# Patient Record
Sex: FEMALE | Race: WHITE | Employment: OTHER | URBAN - METROPOLITAN AREA
[De-identification: names, ages, dates, MRNs, and addresses within clinical notes are randomized per-mention and may not be internally consistent; named-entity substitution may affect disease eponyms.]

---

## 2022-03-11 ENCOUNTER — APPOINTMENT (OUTPATIENT)
Dept: CT IMAGING | Age: 72
End: 2022-03-11
Payer: MEDICARE

## 2022-03-11 ENCOUNTER — HOSPITAL ENCOUNTER (EMERGENCY)
Age: 72
Discharge: HOME | End: 2022-03-11
Attending: STUDENT IN AN ORGANIZED HEALTH CARE EDUCATION/TRAINING PROGRAM
Payer: MEDICARE

## 2022-03-11 ENCOUNTER — APPOINTMENT (OUTPATIENT)
Dept: GENERAL RADIOLOGY | Age: 72
End: 2022-03-11
Payer: MEDICARE

## 2022-03-11 VITALS
HEIGHT: 70 IN | RESPIRATION RATE: 14 BRPM | TEMPERATURE: 97.9 F | OXYGEN SATURATION: 99 % | BODY MASS INDEX: 23.62 KG/M2 | DIASTOLIC BLOOD PRESSURE: 73 MMHG | HEART RATE: 100 BPM | SYSTOLIC BLOOD PRESSURE: 162 MMHG | WEIGHT: 165 LBS

## 2022-03-11 DIAGNOSIS — R07.9 CHEST PAIN, UNSPECIFIED TYPE: Primary | ICD-10-CM

## 2022-03-11 LAB
A/G RATIO: 1.7 (ref 1.1–2.2)
ALBUMIN SERPL-MCNC: 4.8 G/DL (ref 3.4–5)
ALP BLD-CCNC: 71 U/L (ref 40–129)
ALT SERPL-CCNC: 15 U/L (ref 10–40)
ANION GAP SERPL CALCULATED.3IONS-SCNC: 14 MMOL/L (ref 3–16)
AST SERPL-CCNC: 15 U/L (ref 15–37)
BASOPHILS ABSOLUTE: 0 K/UL (ref 0–0.2)
BASOPHILS RELATIVE PERCENT: 0.5 %
BILIRUB SERPL-MCNC: 0.6 MG/DL (ref 0–1)
BUN BLDV-MCNC: 20 MG/DL (ref 7–20)
CALCIUM SERPL-MCNC: 10.3 MG/DL (ref 8.3–10.6)
CHLORIDE BLD-SCNC: 98 MMOL/L (ref 99–110)
CO2: 24 MMOL/L (ref 21–32)
CREAT SERPL-MCNC: 1 MG/DL (ref 0.6–1.2)
EOSINOPHILS ABSOLUTE: 0 K/UL (ref 0–0.6)
EOSINOPHILS RELATIVE PERCENT: 0.2 %
GFR AFRICAN AMERICAN: >60
GFR NON-AFRICAN AMERICAN: 55
GLUCOSE BLD-MCNC: 121 MG/DL (ref 70–99)
HCT VFR BLD CALC: 37.3 % (ref 36–48)
HEMOGLOBIN: 12.6 G/DL (ref 12–16)
LYMPHOCYTES ABSOLUTE: 1.8 K/UL (ref 1–5.1)
LYMPHOCYTES RELATIVE PERCENT: 27.5 %
MCH RBC QN AUTO: 30.7 PG (ref 26–34)
MCHC RBC AUTO-ENTMCNC: 33.7 G/DL (ref 31–36)
MCV RBC AUTO: 91.2 FL (ref 80–100)
MONOCYTES ABSOLUTE: 0.4 K/UL (ref 0–1.3)
MONOCYTES RELATIVE PERCENT: 5.7 %
NEUTROPHILS ABSOLUTE: 4.3 K/UL (ref 1.7–7.7)
NEUTROPHILS RELATIVE PERCENT: 66.1 %
PDW BLD-RTO: 13.7 % (ref 12.4–15.4)
PLATELET # BLD: 316 K/UL (ref 135–450)
PMV BLD AUTO: 7.6 FL (ref 5–10.5)
POTASSIUM SERPL-SCNC: 4.1 MMOL/L (ref 3.5–5.1)
RBC # BLD: 4.09 M/UL (ref 4–5.2)
SODIUM BLD-SCNC: 136 MMOL/L (ref 136–145)
TOTAL PROTEIN: 7.6 G/DL (ref 6.4–8.2)
TROPONIN: <0.01 NG/ML
TROPONIN: <0.01 NG/ML
WBC # BLD: 6.4 K/UL (ref 4–11)

## 2022-03-11 PROCEDURE — 99284 EMERGENCY DEPT VISIT MOD MDM: CPT

## 2022-03-11 PROCEDURE — 99285 EMERGENCY DEPT VISIT HI MDM: CPT

## 2022-03-11 PROCEDURE — 71046 X-RAY EXAM CHEST 2 VIEWS: CPT

## 2022-03-11 PROCEDURE — 71260 CT THORAX DX C+: CPT

## 2022-03-11 PROCEDURE — 80053 COMPREHEN METABOLIC PANEL: CPT

## 2022-03-11 PROCEDURE — 36415 COLL VENOUS BLD VENIPUNCTURE: CPT

## 2022-03-11 PROCEDURE — 84484 ASSAY OF TROPONIN QUANT: CPT

## 2022-03-11 PROCEDURE — 93005 ELECTROCARDIOGRAM TRACING: CPT | Performed by: EMERGENCY MEDICINE

## 2022-03-11 PROCEDURE — 6370000000 HC RX 637 (ALT 250 FOR IP): Performed by: NURSE PRACTITIONER

## 2022-03-11 PROCEDURE — 85025 COMPLETE CBC W/AUTO DIFF WBC: CPT

## 2022-03-11 PROCEDURE — 2500000003 HC RX 250 WO HCPCS: Performed by: NURSE PRACTITIONER

## 2022-03-11 PROCEDURE — 6360000004 HC RX CONTRAST MEDICATION: Performed by: NURSE PRACTITIONER

## 2022-03-11 PROCEDURE — 96374 THER/PROPH/DIAG INJ IV PUSH: CPT

## 2022-03-11 RX ORDER — NITROGLYCERIN 0.4 MG/1
0.4 TABLET SUBLINGUAL EVERY 5 MIN PRN
Status: DISCONTINUED | OUTPATIENT
Start: 2022-03-11 | End: 2022-03-11 | Stop reason: HOSPADM

## 2022-03-11 RX ADMIN — FAMOTIDINE 20 MG: 10 INJECTION, SOLUTION INTRAVENOUS at 16:33

## 2022-03-11 RX ADMIN — IOPAMIDOL 85 ML: 755 INJECTION, SOLUTION INTRAVENOUS at 18:14

## 2022-03-11 RX ADMIN — LIDOCAINE HYDROCHLORIDE: 20 SOLUTION ORAL; TOPICAL at 16:34

## 2022-03-11 RX ADMIN — NITROGLYCERIN 0.4 MG: 0.4 TABLET SUBLINGUAL at 17:07

## 2022-03-11 RX ADMIN — NITROGLYCERIN 0.4 MG: 0.4 TABLET SUBLINGUAL at 16:34

## 2022-03-11 ASSESSMENT — PAIN DESCRIPTION - LOCATION
LOCATION: CHEST
LOCATION: CHEST

## 2022-03-11 ASSESSMENT — PAIN DESCRIPTION - ORIENTATION: ORIENTATION: MID

## 2022-03-11 ASSESSMENT — PAIN DESCRIPTION - PAIN TYPE
TYPE: ACUTE PAIN
TYPE: ACUTE PAIN

## 2022-03-11 ASSESSMENT — PAIN SCALES - GENERAL
PAINLEVEL_OUTOF10: 2
PAINLEVEL_OUTOF10: 5

## 2022-03-11 ASSESSMENT — PAIN DESCRIPTION - FREQUENCY: FREQUENCY: INTERMITTENT

## 2022-03-11 ASSESSMENT — ENCOUNTER SYMPTOMS
SHORTNESS OF BREATH: 0
COLOR CHANGE: 0
FACIAL SWELLING: 0
CHEST TIGHTNESS: 1
RHINORRHEA: 0
SORE THROAT: 0
ABDOMINAL PAIN: 0

## 2022-03-11 ASSESSMENT — PAIN - FUNCTIONAL ASSESSMENT: PAIN_FUNCTIONAL_ASSESSMENT: 0-10

## 2022-03-11 ASSESSMENT — PAIN DESCRIPTION - DESCRIPTORS: DESCRIPTORS: PRESSURE

## 2022-03-11 ASSESSMENT — HEART SCORE: ECG: 0

## 2022-03-11 NOTE — ED NOTES
[id number] - [facility]      [facility]  03/11/22 1814    Dr. Crescencio Rojas is on call for group     [facility]  03/11/22 1819    Heather Tineo - Dr. Lynne Vega called back transferred to KISHAN Rust [facility]  03/11/22 1842

## 2022-03-11 NOTE — ED PROVIDER NOTES
[address] ED  EMERGENCY DEPARTMENT ENCOUNTER        Pt Name: Jamel Green  MRN: [id number]  [place] 1950  Dateof evaluation: 3/11/2022  Provider: PONCE Zamora - NARENDRA  PCP: Dheeraj Fierro MD  ED Attending: No att. providers found    [address] [facility]       Chief Complaint   Patient presents with    Chest Pain     COVID in Jan. c/o mid Sternal CP x1 week, HX: A. Fib        HISTORY OF PRESENTILLNESS   (Location/Symptom, Timing/Onset, Context/Setting, Quality, Duration, Modifying Factors, Severity)  Note limiting factors. Jamel Green is a 73 y.o. female for chest tightness. Onset was today. Context includes patient reports that she has been followed by cardiology in the past and had a negative stress test and December 2021. Patient states that she also had Covid in 2022 and ever since then has had intermittent chest tightness. Patient reports that she has a burning and pressure sensation to the left side of her chest since yesterday. She denies that it is pleuritic or worse with exertion. Alleviating factors include nothing. Aggravating factors include nothing. Pain is 4/10. Antacids has been used for pain today. Nursing Notes were all reviewed and agreed with or any disagreements were addressed  in the HPI. REVIEW OF SYSTEMS    (2-9 systems for level 4, 10 or more for level 5)     Review of Systems   Constitutional: Negative for activity change, appetite change and fever. HENT: Negative for congestion, facial swelling, rhinorrhea and sore throat. Eyes: Negative for visual disturbance. Respiratory: Positive for chest tightness. Negative for shortness of breath. Cardiovascular: Negative for chest pain. Gastrointestinal: Negative for abdominal pain. Genitourinary: Negative for difficulty urinating. Musculoskeletal: Negative for arthralgias and myalgias. Skin: Negative for color change and rash.    Neurological: Negative for dizziness and light-headedness. Psychiatric/Behavioral: Negative for agitation. All other systems reviewed and are negative. Positives and Pertinent negatives as per HPI. Except as noted above in the ROS, all other systems were reviewed and negative.        PAST MEDICAL HISTORY     Past Medical History:   Diagnosis Date    A-fib ([facility] [address]     Hyperlipidemia     Hypertension     Pneumothorax, spontaneous, tension     1977         SURGICAL HISTORY       Past Surgical History:   Procedure Laterality Date    CAPSULOTOMY Right 2/10/16    CATARACT REMOVAL WITH IMPLANT Left 6/3/15    CATARACT REMOVAL WITH IMPLANT Right 6/17/15    CHEST TUBE INSERTION      1977    CHOLECYSTECTOMY      COLONOSCOPY  07/19/2016    diverticulosis         CURRENT MEDICATIONS       Previous Medications    ASPIRIN EC 81 MG EC TABLET    Take 1 tablet by mouth daily    ATORVASTATIN (LIPITOR) 10 MG TABLET    Take 10 mg by mouth daily    DILTIAZEM (CARDIZEM CD) 120 MG ER CAPSULE    Take 1 capsule by mouth daily    FLECAINIDE (TAMBOCOR) 50 MG TABLET    Take 150 mg by mouth 2 times daily     OMEGA-3 FATTY ACIDS (FISH OIL PO)    Take 1,200 mg by mouth daily          ALLERGIES     Calcium channel blockers    FAMILY HISTORY       Family History   Problem Relation Age of Onset    Diabetes Mother     Cancer Mother         breast    Heart Disease Father     High Blood Pressure Father     Cancer Sister         breast    Cancer Brother         prostate    Diabetes Brother     High Blood Pressure Brother           SOCIAL HISTORY       Social History     Socioeconomic History    Marital status:      Spouse name: None    Number of children: None    Years of education: None    Highest education level: None   Occupational History    None   Tobacco Use    Smoking status: Never Smoker    Smokeless tobacco: None   Substance and Sexual Activity    Alcohol use: Yes     Comment: 3/mth    Drug use: None    Sexual activity: None   Other Topics Concern    None   Social History Narrative    None     Social Determinants of Health     Financial Resource Strain:     Difficulty of Paying Living Expenses: Not on file   Food Insecurity:     Worried About Running Out of Food in the Last Year: Not on file    Elvia of Food in the Last Year: Not on file   Transportation Needs:     Lack of Transportation (Medical): Not on file    Lack of Transportation (Non-Medical):  Not on file   Physical Activity:     Days of Exercise per Week: Not on file    Minutes of Exercise per Session: Not on file   Stress:     Feeling of Stress : Not on file   Social Connections:     Frequency of Communication with Friends and Family: Not on file    Frequency of Social Gatherings with Friends and Family: Not on file    Attends Restorationist Services: Not on file    Active Member of [address] [place] [address] [employer] or Organizations: Not on file    Attends Club or Organization Meetings: Not on file    Marital Status: Not on file   Intimate Partner Violence:     Fear of Current or Ex-Partner: Not on file    Emotionally Abused: Not on file    Physically Abused: Not on file    Sexually Abused: Not on file   Housing Stability:     Unable to Pay for Housing in the Last Year: Not on file    Number of [place] in the Last Year: Not on file    Unstable Housing in the Last Year: Not on file       SCREENINGS    Chestnut Coma Scale  Eye Opening: Spontaneous  Best Verbal Response: Oriented  Best Motor Response: Obeys commands  Chestnut Coma Scale Score: 15 Heart Score for chest pain patients  History: Slightly Suspicious  ECG: Normal  Patient Age: > 65 years  *Risk factors for Atherosclerotic disease: Hypertension,Hypercholesterolemia  Risk Factors: 1 or 2 risk factors  Troponin: < 1X normal limit  Heart Score Total: 3      PHYSICAL EXAM  (up to 7 for level 4, 8 or more for level 5)     ED Triage Vitals [03/11/22 1531]   BP Temp Temp Source Pulse Resp SpO2 Height Weight   (!) 167/79 97.9 °F (36.6 °C) Oral 97 18 100 % 5' 10\" (1.778 m) 165 lb (74.8 kg)       Physical Exam  Constitutional:       Appearance: She is well-developed. HENT:      Head: Normocephalic and atraumatic. Cardiovascular:      Rate and Rhythm: Normal rate. Pulmonary:      Effort: Pulmonary effort is normal. No respiratory distress. Abdominal:      General: There is no distension. Palpations: Abdomen is soft. Tenderness: There is no abdominal tenderness. Musculoskeletal:         General: Normal range of motion. Cervical back: Normal range of motion. Skin:     General: Skin is warm and dry. Neurological:      Mental Status: She is alert and oriented to person, place, and time. DIAGNOSTIC RESULTS   LABS:    Labs Reviewed   COMPREHENSIVE METABOLIC PANEL - Abnormal; Notable for the following components:       Result Value    Chloride 98 (*)     Glucose 121 (*)     GFR Non- 55 (*)     All other components within normal limits   CBC WITH AUTO DIFFERENTIAL   TROPONIN   TROPONIN       All other labs werewithin normal range or not returned as of this dictation. EKG: All EKG's are interpreted by the Emergency Department Physician who either signs or Co-signs this chart in the absence of a cardiologist.  Please see their note for interpretation of EKG. RADIOLOGY:     Chest x-ray interpreted by radiologist for  Impression:    No acute process. CT chest pulmonary embolism with contrast interpreted by radiologist for  Impression:    No evidence of pulmonary embolism or acute thoracic aortic abnormality. Minimal biapical scarring.  Otherwise, clear lungs. Interpretation per the Radiologist below, if available at the time of this note:    CT CHEST PULMONARY EMBOLISM W CONTRAST   Final Result   No evidence of pulmonary embolism or acute thoracic aortic abnormality. Minimal biapical scarring. Otherwise, clear lungs. XR CHEST (2 VW)   Final Result   No acute process.            XR CHEST (2 VW)    Result Date: 3/11/2022  EXAMINATION: TWO XRAY VIEWS OF THE CHEST 3/11/2022 3:42 pm COMPARISON: 10/03/2015 HISTORY: ORDERING SYSTEM PROVIDED HISTORY: chest pain TECHNOLOGIST PROVIDED HISTORY: Reason for exam:->chest pain Reason for Exam: chest pain FINDINGS: The lungs are without acute focal process. There is no effusion or pneumothorax. The cardiomediastinal silhouette is stable. The osseous structures are stable. No acute process. PROCEDURES   Unless otherwise noted below, none     Procedures     CRITICAL CARE TIME   N/A    CONSULTS:  IP CONSULT TO CARDIOLOGY      EMERGENCYDEPARTMENT COURSE and DIFFERENTIAL DIAGNOSIS/MDM:   Vitals:    Vitals:    03/11/22 1602 03/11/22 1706 03/11/22 1740 03/11/22 1800   BP: (!) 165/70 (!) 161/67 (!) 155/73 (!) 162/73   Pulse: 92 87 97 100   Resp: 12 18 14 14   Temp:       TempSrc:       SpO2: 98% 99% 99% 99%   Weight:       Height:           Patient was given the following medications:  Medications   nitroGLYCERIN (NITROSTAT) SL tablet 0.4 mg (0.4 mg SubLINGual Given 3/11/22 1707)   aluminum & magnesium hydroxide-simethicone (MAALOX) 30 mL, lidocaine viscous hcl (XYLOCAINE) 5 mL (GI COCKTAIL) ( Oral Given 3/11/22 1634)   famotidine (PEPCID) injection 20 mg (20 mg IntraVENous Given 3/11/22 1633)   iopamidol (ISOVUE-370) 76 % injection 85 mL (85 mLs IntraVENous Given 3/11/22 1814)     Patient was seen and evaluated by myself and Dr. Cleve Burch. Patient here for concerns for chest tightness. Patient reports that she has had chest pressure since earlier today. She reports she has a history of A. fib and takes Eliquis and diltiazem. States that she had a cath done in December 2021 that was negative. Patient reports that she had COVID in January 2022 and ever since then has had intermittent chest tightness. Patient states that her pain started yesterday. She denies that is pleuritic or worse with exertion.   Patient reports that she has taken antacids with no relief. On exam she is awake and alert hemodynamically stable nontoxic in appearance. Lab values have been reviewed and interpreted. Patient was provided with nitroglycerin GI cocktail and Pepcid. Heart score 3. Consult was placed to Dr. Aruna Amezquita from the patient's cardiology group who felt the patient was appropriate to be discharged due to the fact that she has had 2 - troponins, chest pain greater than 24 hours and she had no changes to her EKG. He felt that she could follow-up outpatient with her cardiologist to discuss further plans. Patient was in agreement with this. At this point the patient will be discharged with instructions to follow-up with her primary care doctor in the next 2 days and return to the ED for any worsening symptoms. Patient was encouraged to continue taking her Eliquis and diltiazem. She was encouraged to return to the ED for any worsening symptoms. Patient was ultimately discharged with all questions answered    The patient tolerated their visit well. I have evaluated this patient. My supervising physician was available for consultation. The patient and / or the family were informed of the results of any tests, a time was given to answer questions, a plan was proposed and they agreed with plan. FINAL IMPRESSION      1.  Chest pain, unspecified type          DISPOSITION/PLAN   DISPOSITION Decision To Discharge 03/11/2022 07:42:26 PM      PATIENT REFERRED TO:  Stephanie Kaye MD  [phone]    In 2 days  If symptoms worsen    [facility] ED  [address] [facility]  [phone]    If symptoms worsen    Citlaly Hardin MD  [place] [address] [phone]  [place] [phone]    Call in 1 day  for re-evaluation      DISCHARGE MEDICATIONS:  New Prescriptions    No medications on file       DISCONTINUED MEDICATIONS:  Discontinued Medications    No medications on file              (Please note that portions of this note were completed with a voice recognition program.  Efforts were made to edit the dictations but occasionally words are mis-transcribed.)    OPNCE Hartmann CNP (electronically signed)         PONCE Hartmann CNP  03/11/22 PONCE Daley CNP  03/11/22 1947

## 2022-03-12 LAB
EKG ATRIAL RATE: 100 BPM
EKG DIAGNOSIS: NORMAL
EKG P AXIS: 68 DEGREES
EKG P-R INTERVAL: 188 MS
EKG Q-T INTERVAL: 348 MS
EKG QRS DURATION: 90 MS
EKG QTC CALCULATION (BAZETT): 448 MS
EKG R AXIS: 42 DEGREES
EKG T AXIS: 50 DEGREES
EKG VENTRICULAR RATE: 100 BPM

## 2022-03-12 PROCEDURE — 93010 ELECTROCARDIOGRAM REPORT: CPT | Performed by: INTERNAL MEDICINE

## 2022-03-14 NOTE — ED PROVIDER NOTES
I independently examined and evaluated Luis Angel Green. I personally saw the patient and performed a substantive portion of the visit including all aspects of the medical decision making    In brief, Janae Rodgers is a 73 y.o. female with a past medical history of atrial fibrillation, hyperlipidemia, hypertension, and spontaneous pneumothorax, who presents to the ED complaining of chest tightness. Patient reports intermittent chest tightness since she had coronavirus in January 2022. Patient reportedly had a negative stress test in December 2021. She reports a tightness/burning/pressure to the left side of her chest.  Most recent onset was yesterday. Nonpleuritic, nonexertional, nonpositional.  No palliative or provocative factors, currently pain-free on my assessment. She used antacids today. She denies cough or fever. She denies shortness of breath. Denies abdominal pain, vomiting, diarrhea, headache, lightheadedness or other complaints. Denies history of blood clots or active malignancy. Patient denies unilateral leg swelling, hemoptysis, recent travel or surgery/immobilization, or OCP or other hormone use. REVIEW OF SYSTEMS  All systems reviewed, pertinent positives per HPI otherwise noted to be negative. Focused exam revealed   PHYSICAL EXAM  BP (!) 167/79   Pulse 97   Temp 97.9 °F (36.6 °C) (Oral)   Resp 18   Ht 5' 10\" (1.778 m)   Wt 165 lb (74.8 kg)   SpO2 100%   BMI 23.68 kg/m²    GENERAL APPEARANCE: Awake and alert. Cooperative. no distress. HENT: Normocephalic. Atraumatic. Mucous membranes are moist  NECK: Supple. Full range of motion of the neck without stiffness or pain. EYES: PERRL. EOM's grossly intact. HEART/CHEST: RRR. No murmurs. Chest wall is not tender to palpation. LUNGS: Respirations unlabored. CTAB. Good air exchange. Speaking comfortably in full sentences. ABDOMEN: No tenderness. Soft. Non-distended. No masses. No organomegaly. No guarding or rebound. leukocytosis, anemia, thrombocytopenia [ER]   1818 Chest x-ray shows no evidence of pneumonia, pneumothorax, pleural effusion, pulmonary edema [ER]   1918 Delta troponin within normal limits [ER]   1932 CT PE: IMPRESSION:  No evidence of pulmonary embolism or acute thoracic aortic abnormality. Minimal biapical scarring. Otherwise, clear lungs. [ER]   1949 Midlevel provider spoke to [facility] cardiology. They stated the patient to be discharged to follow-up on an outpatient basis for cardiac cath. Patient is comfortable with this plan. [ER]      ED Course User Index  [ER] Concepción Vasquez MD       At this time, feel the patient is appropriate for discharge to follow-up with a primary care doctor and [facility] cardio on outpatient basis. Patient feels comfortable with discharge at this time. Strict return precautions given. Encouraged PCP follow-up in 2 days and cardiology soon as possible. Patient discharged in stable condition. CLINICAL IMPRESSION  1. Chest pain, unspecified type        Blood pressure (!) 162/73, pulse 100, temperature 97.9 °F (36.6 °C), temperature source Oral, resp. rate 14, height 5' 10\" (1.778 m), weight 165 lb (74.8 kg), SpO2 99 %, not currently breastfeeding. DISPOSITION  Josue Green was discharged to home in stable condition. All diagnostic, treatment, and disposition decisions were made by myself in conjunction with the advanced practice provider. For all further details of the patient's emergency department visit, please see the advanced practice provider's documentation. Comment: Please note this report has been produced using speech recognition software and may contain errors related to that system including errors in grammar, punctuation, and spelling, as well as words and phrases that may be inappropriate. If there are any questions or concerns please feel free to contact the dictating provider for clarification.         Concepción Vasquez MD  03/15/22 2038